# Patient Record
(demographics unavailable — no encounter records)

---

## 2025-01-16 NOTE — PHYSICAL EXAM
[General Appearance - Alert] : alert [General Appearance - In No Acute Distress] : in no acute distress [Affect] : the affect was normal [Involuntary Movements] : no involuntary movements were seen [Non-ambulatory] : Non-ambulatory [FreeTextEntry1] : Limited d/t patient inability to participate She is sitting in her wheelchair No spontaneous eye contact - does not turn her head when voice is called Follows no commands When exam is attempted - she looks up and starts making spitting noises requiring aide to calm her down Dysconjugate gaze Arms are crossed, she moves her arms sponteously Does not move her LEs  [FreeTextEntry6] : slightly increased tone in b/l UE

## 2025-01-16 NOTE — DISCUSSION/SUMMARY
[FreeTextEntry1] : 26y F with Hx cerebral palsy, intractable seizures, here for follow-up with stable disease.  No seizures since 12/2023.  Much better with clobazam Will continue on current medication regimen.  Blood work obtained on 11/24 from [facility]  Recommendations: [] continue w/ clobazam 20mg BID and VPA 500mg BID - refills sent [] Follow up CBC, CMP, VPA level  in 6 months  [] RTC in 6 months  Case discussed with Dr. Jurado

## 2025-01-16 NOTE — END OF VISIT
[] : Resident [FreeTextEntry3] : Patient seen with the resident staff an agree to plan as discussed above in detail.  Total time 20min in discussion with patient and team regarding care.

## 2025-01-16 NOTE — DISCUSSION/SUMMARY
[FreeTextEntry1] : 26y F with Hx cerebral palsy, intractable seizures, here for follow-up with stable disease.  No seizures since 12/2023.  Much better with clobazam Will continue on current medication regimen.  Blood work obtained on 11/24 from [facility]  Recommendations: [] continue w/ clobazam 20mg BID and VPA 500mg BID - refills sent [] Follow up CBC, CMP, VPA level  in 6 months  [] RTC in 6 months  Case discussed with Dr. Jurado

## 2025-01-16 NOTE — HISTORY OF PRESENT ILLNESS
[FreeTextEntry1] : 1/16/2025 Follow up visit with caregiver Katie. No seizure like events since last visit > 1 year. Mood and sleep ok. No complaints.  Needs refills - sent. Patient non-verbal, does not respond to any commands.  Last  seizure on 12/19/23. Described as 30 seconds with eyes rolled back. Normal vital signs recorded during episode. No incontinence, skin color, abnormal movements or respiration changes during this episode.  She is taking the clobazam and VPA as prescribed.   During the previous visit in June 2023, patient was sent from her group home to explore the possibility of VNS after she had 5 seizures in about a month. She was started on Colbazam 20 mg BID. She has had a decrease in quantity of seizures  5x events since her last visit: 2x on 5/29, 2x on 5/30, and 1x on 6/3. Each event lasts a few seconds and consists of generalized shaking, arms raised, eyes rolled back. She falls asleep afterwards. No missed doses or recent illnesses.  No ADRs  Alert

## 2025-07-03 NOTE — DISCUSSION/SUMMARY
[FreeTextEntry1] : 27y F with Hx cerebral palsy, intractable seizures, here for follow-up with stable disease. No seizures since 12/2023. Much better with clobazam Will continue on current medication regimen. Last blood work obtained on 11/24 from [facility]   Recommendations: [] continue w/ clobazam 20mg BID and VPA 500mg BID - refills sent [] Follow up CBC, CMP, VPA level [] RTC in 6 months

## 2025-07-03 NOTE — HISTORY OF PRESENT ILLNESS
[FreeTextEntry1] : interval visit: 7/3/2025  patient seen with Katie, caregiver from the group home. Patient was reportedly stable without any seizure episodes taking medications as prescribed. Katie noticed that patient started experiencing mild hair loss in the back of her head after being on valproic acid but this is not bothersome and not significant and medication controlling seizures well.  1/16/2025 Follow up visit with caregiver Katie. No seizure like events since last visit > 1 year. Mood and sleep ok. No complaints. Needs refills - sent. Patient non-verbal, does not respond to any commands.  Last seizure on 12/19/23. Described as 30 seconds with eyes rolled back. Normal vital signs recorded during episode. No incontinence, skin color, abnormal movements or respiration changes during this episode. She is taking the clobazam and VPA as prescribed.  During the previous visit in June 2023, patient was sent from her group home to explore the possibility of VNS after she had 5 seizures in about a month. She was started on Colbazam 20 mg BID. She has had a decrease in quantity of seizures 5x events since her last visit: 2x on 5/29, 2x on 5/30, and 1x on 6/3. Each event lasts a few seconds and consists of generalized shaking, arms raised, eyes rolled back. She falls asleep afterwards. No missed doses or recent illnesses.

## 2025-07-03 NOTE — PHYSICAL EXAM
[FreeTextEntry1] : Limited d/t patient inability to participate She is sitting in her wheelchair No spontaneous eye contact - does not turn her head when voice is called Follows no commands When exam is attempted - she looks up and starts making spitting noises requiring aide to calm her down Dysconjugate gaze Arms are crossed, she moves her arms sponteously Does not move her LEs.  Constitutional: alert and in no acute distress.  Psychiatric: the affect was normal.  Neurologic:   Motor: no involuntary movements were seen. slightly increased tone in b/l UE   Coordination: Non-ambulatory.